# Patient Record
Sex: FEMALE | Race: WHITE | NOT HISPANIC OR LATINO | URBAN - METROPOLITAN AREA
[De-identification: names, ages, dates, MRNs, and addresses within clinical notes are randomized per-mention and may not be internally consistent; named-entity substitution may affect disease eponyms.]

---

## 2021-03-24 ENCOUNTER — OFFICE VISIT (OUTPATIENT)
Dept: URBAN - METROPOLITAN AREA CLINIC 70 | Facility: CLINIC | Age: 71
End: 2021-03-24
Payer: MEDICARE

## 2021-03-24 ENCOUNTER — WEB ENCOUNTER (OUTPATIENT)
Dept: URBAN - METROPOLITAN AREA CLINIC 70 | Facility: CLINIC | Age: 71
End: 2021-03-24

## 2021-03-24 DIAGNOSIS — K21.9 GERD: ICD-10-CM

## 2021-03-24 DIAGNOSIS — K22.70 BARRETT'S ESOPHAGUS: ICD-10-CM

## 2021-03-24 PROCEDURE — 99214 OFFICE O/P EST MOD 30 MIN: CPT | Performed by: REGISTERED NURSE

## 2021-03-24 RX ORDER — OXYBUTYNIN CHLORIDE 5 MG/1
1 TABLET TABLET ORAL TWICE A DAY
Status: ACTIVE | COMMUNITY

## 2021-03-24 RX ORDER — METOPROLOL TARTRATE 25 MG/1
TAKE 1 TABLET (25 MG) BY ORAL ROUTE ONCE DAILY TABLET, FILM COATED ORAL 1
Qty: 0 | Refills: 0 | Status: DISCONTINUED | COMMUNITY
Start: 1900-01-01

## 2021-03-24 RX ORDER — ASPIRIN 81 MG/1
1 TABLET TABLET, COATED ORAL ONCE A DAY
Status: ACTIVE | COMMUNITY

## 2021-03-24 RX ORDER — HYDRALAZINE HYDROCHLORIDE 50 MG/1
TABLET ORAL
Qty: 90 UNSPECIFIED | Status: DISCONTINUED | COMMUNITY

## 2021-03-24 RX ORDER — ASPIRIN 325 MG/1
TABLET ORAL
Qty: 0 | Refills: 0 | Status: DISCONTINUED | COMMUNITY
Start: 1900-01-01

## 2021-03-24 RX ORDER — LISINOPRIL 20 MG/1
TAKE 1 TABLET (20 MG) BY ORAL ROUTE ONCE DAILY TABLET ORAL 1
Qty: 0 | Refills: 0 | Status: DISCONTINUED | COMMUNITY
Start: 1900-01-01

## 2021-03-24 RX ORDER — PANTOPRAZOLE SODIUM 40 MG/1
TABLET, DELAYED RELEASE ORAL
Qty: 90 UNSPECIFIED | Status: ACTIVE | COMMUNITY

## 2021-03-24 RX ORDER — VALSARTAN 160 MG/1
TABLET ORAL
Qty: 180 UNSPECIFIED | Status: ACTIVE | COMMUNITY

## 2021-03-24 RX ORDER — IBUPROFEN 200 MG/1
TAKE 1 CAPSULE (200 MG) BY ORAL ROUTE EVERY 6 HOURS AS NEEDED CAPSULE, LIQUID FILLED ORAL
Qty: 0 | Refills: 0 | Status: ACTIVE | COMMUNITY
Start: 1900-01-01

## 2021-03-24 RX ORDER — ESCITALOPRAM 10 MG/1
TABLET, FILM COATED ORAL
Qty: 0 | Refills: 0 | Status: ACTIVE | COMMUNITY
Start: 1900-01-01

## 2021-03-24 RX ORDER — PANTOPRAZOLE SODIUM 40 MG/1
TABLET, DELAYED RELEASE ORAL
OUTPATIENT

## 2021-03-24 RX ORDER — CARVEDILOL 6.25 MG/1
TABLET, FILM COATED ORAL
Qty: 180 UNSPECIFIED | Status: ACTIVE | COMMUNITY

## 2021-03-24 NOTE — HPI-TODAY'S VISIT:
Pt has a history of GERD and Owen's esophagus. EGD on 11/19/19 showed surgical changes and Owen's. Biopsies indefinite for dysplasia. She c/o frequent episodes of heartburn and regurgitation of stomach contents. She takes pantoprazole every day but takes later in the day after she eats.

## 2021-04-26 ENCOUNTER — OFFICE VISIT (OUTPATIENT)
Dept: URBAN - METROPOLITAN AREA CLINIC 70 | Facility: CLINIC | Age: 71
End: 2021-04-26
Payer: MEDICARE

## 2021-04-26 ENCOUNTER — DASHBOARD ENCOUNTERS (OUTPATIENT)
Age: 71
End: 2021-04-26

## 2021-04-26 DIAGNOSIS — K22.70 BARRETT'S ESOPHAGUS: ICD-10-CM

## 2021-04-26 DIAGNOSIS — K21.9 GERD: ICD-10-CM

## 2021-04-26 PROCEDURE — 99214 OFFICE O/P EST MOD 30 MIN: CPT | Performed by: REGISTERED NURSE

## 2021-04-26 RX ORDER — OXYBUTYNIN CHLORIDE 5 MG/1
1 TABLET TABLET ORAL TWICE A DAY
Status: ACTIVE | COMMUNITY

## 2021-04-26 RX ORDER — CARVEDILOL 6.25 MG/1
TABLET, FILM COATED ORAL
Qty: 180 UNSPECIFIED | Status: ACTIVE | COMMUNITY

## 2021-04-26 RX ORDER — PANTOPRAZOLE SODIUM 40 MG/1
TABLET, DELAYED RELEASE ORAL
OUTPATIENT

## 2021-04-26 RX ORDER — ASPIRIN 81 MG/1
1 TABLET TABLET, COATED ORAL ONCE A DAY
Status: ACTIVE | COMMUNITY

## 2021-04-26 RX ORDER — IBUPROFEN 200 MG/1
TAKE 1 CAPSULE (200 MG) BY ORAL ROUTE EVERY 6 HOURS AS NEEDED CAPSULE, LIQUID FILLED ORAL
Qty: 0 | Refills: 0 | Status: ACTIVE | COMMUNITY
Start: 1900-01-01

## 2021-04-26 RX ORDER — PANTOPRAZOLE SODIUM 40 MG/1
TABLET, DELAYED RELEASE ORAL
Status: ACTIVE | COMMUNITY

## 2021-04-26 RX ORDER — VALSARTAN 160 MG/1
TABLET ORAL
Qty: 180 UNSPECIFIED | Status: ACTIVE | COMMUNITY

## 2021-04-26 RX ORDER — ESCITALOPRAM 10 MG/1
TABLET, FILM COATED ORAL
Qty: 0 | Refills: 0 | Status: ACTIVE | COMMUNITY
Start: 1900-01-01

## 2021-04-26 NOTE — HPI-TODAY'S VISIT:
Note from OV 3/24/21: Pt has a history of GERD and Owen's esophagus. EGD on 11/19/19 showed surgical changes and Owen's. Biopsies indefinite for dysplasia. She c/o frequent episodes of heartburn and regurgitation of stomach contents. She takes pantoprazole every day but takes later in the day after she eats.  TODAY: GERD has improved dramatically after changing the timing of her meds. She had one episode of vomiting since her last visit and has not had any heartburn.

## 2021-04-27 PROBLEM — 302914006 BARRETT'S ESOPHAGUS: Status: ACTIVE | Noted: 2021-03-24

## 2021-04-27 PROBLEM — 235595009 GASTROESOPHAGEAL REFLUX DISEASE: Status: ACTIVE | Noted: 2021-03-24

## 2025-05-05 NOTE — PHYSICAL EXAM NECK/THYROID:
MEDICARE WELLNESS VISIT + NOTE    CHIEF COMPLAINT:  Glenys Costello presents for her Subsequent Annual Medicare Wellness Visit.   Her additional complaints or concerns are addressed below.     Health Maintenance:  Colonoscopy: Last completed in 2020, recommended 5-year follow-up.  Patient due this upcoming July for repeat screening.  Mammogram: Discontinued.  Dexa Scan: Last completed in 2015 showing normal bone density.  Vaccines Due: Shingles, RSV, COVID-19 booster  Diabetic health maintenance: Patient due for microalbumin     Patient Care Team:  Hyacinth Van APNP as PCP - General (Nurse Practitioner)  Haroldo Boyce MD as Ophthalmology (Ophthalmology)  Services, Other Outpatient as Dentist- General Practice (OP Other)  Services, Other Outpatient as Podiatry (OP Other)  Michael Mcginnis MD (General Surgery)  Zohra Stewart MD (Urology)  Alex Shaw MD as Ophthalmology (Ophthalmology)  Aime Cuevas MD (General Surgery)  Niko Golden MD (Gastroenterology)  Vic Deluca MD (Neurological Surgery)  Cooper Villatoro MD as Orthopedic Surgeon (Physical Medicine & Rehabilitation Sports Medicine)  Milagros Ibarra, KAUR as Referring Provider (Optometry)        Patient Active Problem List   Diagnosis   • Hyperlipemia   • Essential hypertension, benign   • Recurrent major depressive disorder, in full remission (CMD)   • Symptomatic varicose veins of both lower extremities   • Insomnia, unspecified   • Type 2 diabetes mellitus without complication, with long-term current use of insulin (CMD)   • Cervical radiculopathy at C6   • Combined forms of age-related cataract of both eyes   • Myopia of both eyes with astigmatism and presbyopia   • Dermatochalasis of eyelids of both eyes   • Anterior subluxation of humerus   • Hematuria   • Ingrowing toenail   • Muscle tear   • Onychomycosis   • Post-concussion vertigo   • Superficial bruising   • Bradycardia   • CHI (closed head injury)   • Depression   •  normal appearance , without tenderness upon palpation , no deformities , trachea midline , Thyroid normal size , no thyroid nodules , no masses , no JVD , thyroid nontender Headache   • Hypotension   • Restless sleeper   • Transient cerebral ischemia   • Witnessed episode of apnea   • Snorings   • Dysphagia   • Presbyopia   • Regular astigmatism, bilateral   • Senile entropion of right lower eyelid   • Vitreous degeneration, right eye         Past Medical History:   Diagnosis Date   • Allergy    • Anemia    • Anxiety    • Arthritis     hips and back, right  knee   • Cerebral artery occlusion with cerebral infarction  (CMD)     ministroke   • Cervical radiculopathy at C6 05/26/2016   • Combined forms of age-related cataract of both eyes 05/09/2017   • Depression with anxiety    • Depressive disorder    • Dislocation, shoulder    • DM (diabetes mellitus), type 2  (CMD)    • DVT (deep venous thrombosis)  (CMD)     LLE   • Esophageal reflux    • Essential hypertension, benign    • Failed moderate sedation during procedure     \"didn't want to wake up last time\"   • Gastro-oesophageal reflux disease    • Hemorrhoids    • Hx-TIA (transient ischemic attack)    • Hypercholesteremia    • Hyperlipemia    • Insomnia, unspecified    • Mixed incontinence    • Pneumonia    • Psoriasis    • Recurrent UTI    • Recurrent UTI    • Rhinitis    • Sinusitis    • Type II or unspecified type diabetes mellitus without mention of complication, not stated as uncontrolled    • Unspecified sinusitis (chronic)    • Varicose veins          Past Surgical History:   Procedure Laterality Date   • Breast surgery  05/02/2004    breast cyst, left; cyst between the breasts   • Cataract extraction, bilateral     • Cholecystectomy  01/29/2015   • Colonoscopy  04/15/2015   • Colonoscopy  07/29/2020    repeat in 5 years   • Colonoscopy diagnostic  09/26/2008   • Cytopath cerv/vag interpret  05/10/2012   • Esophagogastroduodenoscopy  08/14/2018   • Hysterectomy     • Left heart cath,percutaneous  03/27/2007    Cardiac Cath   • Service to gastroenterology      gallstone removed   • Tonsillectomy and adenoidectomy Bilateral  06/30/1954   • Tubal ligation     • Vein ligation and stripping  04/14/2011         Social History     Tobacco Use   • Smoking status: Never     Passive exposure: Current   • Smokeless tobacco: Never   Vaping Use   • Vaping status: never used   Substance Use Topics   • Alcohol use: No   • Drug use: No     Family History   Problem Relation Age of Onset   • Cancer, Endometrial Mother    • Kidney disease Mother    • Cancer Mother    • Osteoarthritis Mother    • Diabetes Mother    • Hypertension Mother    • Hyperlipidemia Mother    • Stroke Mother    • Hyperthyroid Mother    • Heart disease Father    • Hyperlipidemia Father    • Hypertension Father    • Stroke Father    • Osteoarthritis Father    • Diabetes Father    • Hyperthyroid Father    • Osteoarthritis Sister    • Diabetes Sister    • Hypertension Sister    • Hyperlipidemia Sister    • Stroke/TIA Sister    • Cataracts Sister    • Hyperthyroid Sister    • Diabetes Sister    • Osteoarthritis Sister    • Hyperlipidemia Sister    • Hypertension Sister    • Cataracts Sister    • Hyperthyroid Sister    • Cancer, Colon Brother    • COPD Brother    • Hypertension Brother    • Hyperlipidemia Brother    • Cancer Brother         Bladder cancer.   • Diabetes Brother    • Hypertension Brother    • Hyperlipidemia Brother    • Cataracts Brother    • Cancer, Breast Maternal Grandmother 85         Current Outpatient Medications   Medication Sig Dispense Refill   • metFORMIN (GLUCOPHAGE-XR) 500 MG 24 hr tablet Take 2 tablets by mouth daily (with breakfast). Please blister pack patients medications if possible. 60 tablet 1   • oxybutynin (DITROPAN-XL) 5 MG 24 hr tablet Take 1 tablet by mouth daily. 30 tablet 1   • insulin glargine (Lantus SoloStar) 100 UNIT/ML pen-injector Inject 54 Units into the skin nightly. Prime 2 units before each dose.  Discard pen after 28 days 15 mL 5   • Lancets Misc. Kit Use to test three times daily.  Diabetic on insulin , has One Touch test system-90  day supply (#300 300 kit 5   • atorvastatin (LIPITOR) 20 MG tablet Take 1 tablet by mouth daily. 90 tablet 3   • losartan (Cozaar) 50 MG tablet Take 1 tablet by mouth daily. 90 tablet 3   • PARoxetine (PAXIL) 40 MG tablet Take 1 tablet by mouth daily. 90 tablet 3   • pantoprazole (PROTONIX) 40 MG tablet Take 1 tablet by mouth in the morning and 1 tablet in the evening. 180 tablet 3   • Glucosamine-Chondroit-Vit C-Mn (Glucosamine Chondr 500 Complex) tablet Take 1 tablet by mouth in the morning and 1 tablet in the evening.     • Berberine Chloride 500 MG Cap Take 1 tablet by mouth daily.     • UNKNOWN TO PATIENT Indications: Focus Factor.     • Turmeric 400 MG Cap Take 400 mg by mouth in the morning and 400 mg in the evening.     • Nutritional Supplements (NUTRITIONAL SUPPLEMENT PLUS PO) Take by mouth daily. \"Brain focus\"     • Insulin Pen Needle (Pen Needles) 31G X 6 MM Misc Use as directed with solostar pen 100 each 2   • mirtazapine (REMERON) 7.5 MG tablet Take 0.5 tablets by mouth nightly. 45 tablet 3   • tiZANidine (ZANAFLEX) 4 MG tablet Take 1 tablet by mouth every 8 hours as needed (myalgias). 90 tablet 0   • diclofenac (VOLTAREN) 1 % gel Apply 2 gm to affected area one to two times daily 100 g 0   • meloxicam (Mobic) 15 MG tablet Take 1 tablet by mouth daily. 90 tablet 1   • blood glucose (ONE TOUCH ULTRA TEST) test strip Test three times daily, dx E11.8 100 strip 11   • DISPENSE 1 pair compression stockings. DX:I83.90 2 each 0   • DISPENSE Diabetic Shoes  DX: IDDM, E11.65 1 each 0   • Blood Glucose Monitoring Suppl w/Device Kit One Touch Ultra 2 Unit , Test Blood Sugar twice daily DX  E 11.8 1 kit 0   • DISPENSE Dispense:  Pair of Diabetic Shoes with inserts    DX: DM-2 1 each 0   • acetaminophen (TYLENOL) 500 MG tablet Take 500 mg by mouth every 6 hours as needed for Pain.     • aspirin 81 MG tablet Pt is to take 2 pills daily. 60 tablet 0     No current facility-administered medications for this visit.         The following items on the Medicare Health Risk Assessment were found to be positive  1.) Do you have an Advance directive, living will, or power of  for health care document that contains your wishes for end of life care?: No     2.) Would you like additional information on advance directives?: Yes     6 a.) How many servings of Fruits and Vegetables do you have each day ( 1 serving = 1 piece of fruit, 1/2 cup fruits or vegetables): 1 per day     6 b.) How many servings of High Fiber / Whole Grain Foods to you have each day ( 1 serving = 1 cup cold cereal, 1/2 cup cooked cereal, 1 slice bread): 1 per day     7a.) Have you had a fall in the past year?: Yes     7c.) Do you worry about falling?: Yes     11a.) Bladder Control problems (urine leaking): Always     11d.) Bodily pain: Often     11e.) Tiredness or Fatigue: Always     11f.) Feeling stressed or overwhelmed: Often     11h.) Problems with your hearing: Often     11i.) Problems using the telephone: Often     14.) During the past 4 weeks, was someone available to help if you needed and wanted help?: No, not at all     15.) How confident are you that you can control and manage most of your health problems?: Somewhat confident         Vision and Hearing screens: Not performed    Gait/Balance: Normal    Advance care planning documents on file - no    Cognitive/Functional Status: Word finding during visit.  Patient will be evaluated through [facility] clinic and follow-up with neurology.    Opioid Review: Glenys is not taking opioid medications.    Recent PHQ 2/9 Score:    PHQ 2:  PHQ 2 Score Adult PHQ 2 Score Adult PHQ 2 Interpretation Little interest or pleasure in activity?   5/5/2025  11:16 AM 4 Further screening needed 2       PHQ 9:  PHQ 9 Score Adult PHQ 9 Score Adult PHQ 9 Interpretation   5/5/2025  11:16 AM 8 Mild Depression       DEPRESSION ASSESSMENT/PLAN:  Mild symptoms, will monitor and reassess at next visit. Patient  instructed to contact our office if symptoms worsen.     Body mass index is 28.23 kg/m².    BMI ASSESSMENT/PLAN:  BMI is in overweight range.    Heart healthy diet and activity as tolerated.        See Patient Instructions section.   Return in about 3 months (around 8/5/2025) for DIABETIC FU W PREVISIT LABS.      OUTPATIENT PROGRESS NOTE    Subjective   Chief Complaint Medicare Wellness Visit (Medicare Wellness Visit, Subsequent)       Hypertension:  Losartan 50 mg daily.  Blood pressure today is 138/70 with pulse of 77.  Denies chest pain, palpitations, dizziness/lightheadedness or headaches.     Hyperlipidemia:  Atorvastatin 20 mg daily.  Cholesterol was checked in January triglycerides were mildly elevated 183.     Type 2 diabetes:  Metformin 1000 mg b.i.d., insulin glargine 50 units nightly.  A1c is 7.4.  Patient is due for a repeat microalbumin.  Up-to-date on diabetic eye and foot exam.  In April she was evaluated in urgent care for low blood sugars, and insulin dosing was decreased. She has not experienced any hypoglycemic episodes since her insulin dosage was reduced. Her blood glucose level was recorded as 94 this morning.     Depression:  Taking paroxetine 40 mg daily. Mood has been stable.      Patient is following with Dr. Avila in neurology for ongoing balance/coordination issues, cerebral ventriculomegaly as well as peripheral neuropathy.     Insomnia/restless legs:  Taking Requip 0.5 mg nightly and Remeron 3.75 mg nightly.  She is getting restful sleep.     GERD:  Pantoprazole 40 mg daily, symptoms of acid reflux or well-controlled on medication.     Has been experiencing increased forgetfulness, which she attributes to her hydrocephalus. Memory issues has been causing significant frustration, particularly when she struggles to recall words. She underwent a CT scan in 01/2025, which did not reveal any worsening of her condition.     Medications  Medications were reviewed and updated  today.    Histories  I have personally reviewed and updated the patient's past medical, past surgical, family and social histories during today's visit.    ROS:  CONSTITUTIONAL: Denies unintentional weight gain/loss, fevers/chills, changes in appetite.  EYES: Denies blurred vision, vision changes  HEENT: Denies rhinorrhea, sore throat, changes in hearing.  CARDIOVASCULAR: Denies chest pain, palpitations, lower extremity swelling.  RESPIRATORY: Denies shortness of breath, cough, wheezing.  GASTROINTESTINAL: Denies abdominal pain, heartburn, blood in stool, nausea/vomiting, diarrhea or constipation.  URINARY: Denies painful urination, frequency or hematuria.  GYN: Denies abnormal vaginal discharge, vaginal irritation.  MUSCULOSKELETAL: Denies arthralgias, myalgias.  NEUROLOGICAL: Denies dizziness/lightheadedness, headaches, numbness/tingling.  + memory impairment, word finding.   INTEGUMENTARY: Denies rash, change in moles/skin lesion.  ENDOCRINE: Denies cold/heat intolerance, polyuria, polydipsia, polyphagia.  HEMATOLOGY: Denies bruising/bleeding, swollen lymph nodes.   ALLERGY: Denies seasonal allergies.  PSYCHIATRIC: Denies anxiety, depression. Getting restful sleep.     Objective   Visit Vitals  /70 (BP Location: LUE - Left upper extremity, Patient Position: Sitting, Cuff Size: Large Adult)   Pulse 77   Resp 16   Ht 5' 6\" (1.676 m)   Wt 79.3 kg (174 lb 14.4 oz)   SpO2 99%   BMI 28.23 kg/m²     Physical Exam  Constitutional:       Appearance: Normal appearance.   HENT:      Right Ear: There is impacted cerumen.      Left Ear: Tympanic membrane, ear canal and external ear normal.      Nose: Nose normal. No congestion.      Mouth/Throat:      Mouth: Mucous membranes are moist.      Pharynx: Oropharynx is clear. No oropharyngeal exudate or posterior oropharyngeal erythema.   Eyes:      Extraocular Movements: Extraocular movements intact.      Conjunctiva/sclera: Conjunctivae normal.      Pupils: Pupils are  equal, round, and reactive to light.   Cardiovascular:      Rate and Rhythm: Normal rate and regular rhythm.      Pulses: Normal pulses.      Heart sounds: Normal heart sounds. No murmur heard.     No gallop.   Pulmonary:      Effort: Pulmonary effort is normal.      Breath sounds: Normal breath sounds. No wheezing, rhonchi or rales.   Abdominal:      General: Bowel sounds are normal.      Palpations: Abdomen is soft.      Tenderness: There is no abdominal tenderness. There is no guarding or rebound.   Skin:     General: Skin is warm and dry.   Neurological:      Mental Status: She is alert and oriented to person, place, and time.      Cranial Nerves: Cranial nerves 2-12 are intact.      Coordination: Coordination is intact.      Gait: Gait is intact.      Comments: Word finding during visit   Psychiatric:         Mood and Affect: Mood normal.         Behavior: Behavior normal.       The ASCVD Risk score (Sergio GLEASON, et al., 2019) failed to calculate for the following reasons:    Risk score cannot be calculated because patient has a medical history suggesting prior/existing ASCVD    Assessment/Plan  1. Medicare annual wellness visit, subsequent (Primary)  Reviewed annual labs with patient during visit, no additional blood work needed at this time.  Update microalbumin urine at next office visit.  Addressed all patient concerns today.  Follow-up in 3 months for diabetic follow-up with previsit lab work.    2. Essential hypertension, benign  Blood pressure is well-controlled while on losartan 50 mg daily.  BP today was 138/70, pulse 77.  Continue on current regimen.    3. Mixed hyperlipidemia  Patient taking Atorvastatin 20 mg daily. Cholesterol was checked in January triglycerides were mildly elevated 183.  She will continue on current medication and recommend heart healthy diet, activity as tolerated.    4. Type 2 diabetes mellitus with other neurologic complication, with long-term current use of insulin (CMD)  - Her  A1c levels have shown significant improvement, decreasing from 9.1 in January to 7.4 recently.  - She reports no recent episodes of hypoglycemia. Her current insulin dosage will be maintained.  - A prescription for metformin 2 tablets twice daily has been sent to [facility], along with a request for blister packing of her medications.  She will continue on current dose of insulin glargine.  - She is advised to report any future instances of low blood sugar immediately.  - metFORMIN (GLUCOPHAGE-XR) 500 MG 24 hr tablet; Take 2 tablets by mouth daily (with breakfast). Please blister pack patients medications if possible.  Dispense: 60 tablet; Refill: 1    5. Memory loss or impairment  - A referral to [facility] for a comprehensive memory evaluation has been provided.  - She will contact the clinic to schedule an appointment. Additionally, a referral to neurology has been made for further evaluation of her memory issues. Patient previously following with Dr. Avila.   - If she does not hear from Dr. Montoya's office within a week, she will contact them directly.  - SERVICE TO NEUROLOGY    6. Hydrocephalus, unspecified type  (CMD)  CT in January showing stable ventriculomegaly and noncommunicating hydrocephalus unchanged.  - SERVICE TO NEUROLOGY    7. Recurrent major depressive disorder, in full remission (CMD)  Mood stable on paroxetine.    8. Insomnia, unspecified type  Patient is getting restful sleep. She will continue taking Requip 0.5 mg nightly and Remeron 3.75 mg nightly    CHRISTOPHER Yusuf